# Patient Record
Sex: FEMALE | Race: WHITE | NOT HISPANIC OR LATINO | Employment: OTHER | URBAN - METROPOLITAN AREA
[De-identification: names, ages, dates, MRNs, and addresses within clinical notes are randomized per-mention and may not be internally consistent; named-entity substitution may affect disease eponyms.]

---

## 2022-12-07 ENCOUNTER — OFFICE VISIT (OUTPATIENT)
Dept: PULMONOLOGY | Facility: CLINIC | Age: 87
End: 2022-12-07
Payer: MEDICARE

## 2022-12-07 VITALS
OXYGEN SATURATION: 97 % | HEART RATE: 66 BPM | DIASTOLIC BLOOD PRESSURE: 64 MMHG | WEIGHT: 125 LBS | BODY MASS INDEX: 21.34 KG/M2 | HEIGHT: 64 IN | SYSTOLIC BLOOD PRESSURE: 116 MMHG

## 2022-12-07 DIAGNOSIS — R06.09 DYSPNEA ON EXERTION: ICD-10-CM

## 2022-12-07 DIAGNOSIS — C80.1 NEUROPATHY ASSOCIATED WITH CANCER: Primary | ICD-10-CM

## 2022-12-07 DIAGNOSIS — G63 NEUROPATHY ASSOCIATED WITH CANCER: Primary | ICD-10-CM

## 2022-12-07 DIAGNOSIS — R04.2 HEMOPTYSIS: ICD-10-CM

## 2022-12-07 PROCEDURE — 99214 PR OFFICE/OUTPT VISIT, EST, LEVL IV, 30-39 MIN: ICD-10-PCS | Mod: S$GLB,,, | Performed by: INTERNAL MEDICINE

## 2022-12-07 PROCEDURE — 99999 PR PBB SHADOW E&M-NEW PATIENT-LVL III: CPT | Mod: PBBFAC,,,

## 2022-12-07 PROCEDURE — 3288F FALL RISK ASSESSMENT DOCD: CPT | Mod: CPTII,S$GLB,, | Performed by: INTERNAL MEDICINE

## 2022-12-07 PROCEDURE — 1160F RVW MEDS BY RX/DR IN RCRD: CPT | Mod: CPTII,S$GLB,, | Performed by: INTERNAL MEDICINE

## 2022-12-07 PROCEDURE — 1159F MED LIST DOCD IN RCRD: CPT | Mod: CPTII,S$GLB,, | Performed by: INTERNAL MEDICINE

## 2022-12-07 PROCEDURE — 3288F PR FALLS RISK ASSESSMENT DOCUMENTED: ICD-10-PCS | Mod: CPTII,S$GLB,, | Performed by: INTERNAL MEDICINE

## 2022-12-07 PROCEDURE — 1160F PR REVIEW ALL MEDS BY PRESCRIBER/CLIN PHARMACIST DOCUMENTED: ICD-10-PCS | Mod: CPTII,S$GLB,, | Performed by: INTERNAL MEDICINE

## 2022-12-07 PROCEDURE — 99999 PR PBB SHADOW E&M-NEW PATIENT-LVL III: ICD-10-PCS | Mod: PBBFAC,,,

## 2022-12-07 PROCEDURE — 1101F PR PT FALLS ASSESS DOC 0-1 FALLS W/OUT INJ PAST YR: ICD-10-PCS | Mod: CPTII,S$GLB,, | Performed by: INTERNAL MEDICINE

## 2022-12-07 PROCEDURE — 1159F PR MEDICATION LIST DOCUMENTED IN MEDICAL RECORD: ICD-10-PCS | Mod: CPTII,S$GLB,, | Performed by: INTERNAL MEDICINE

## 2022-12-07 PROCEDURE — 1101F PT FALLS ASSESS-DOCD LE1/YR: CPT | Mod: CPTII,S$GLB,, | Performed by: INTERNAL MEDICINE

## 2022-12-07 PROCEDURE — 99214 OFFICE O/P EST MOD 30 MIN: CPT | Mod: S$GLB,,, | Performed by: INTERNAL MEDICINE

## 2022-12-07 RX ORDER — GABAPENTIN 400 MG/1
800 CAPSULE ORAL 3 TIMES DAILY
Qty: 120 CAPSULE | Refills: 6 | Status: SHIPPED | OUTPATIENT
Start: 2022-12-07 | End: 2023-02-01 | Stop reason: SDUPTHER

## 2022-12-07 RX ORDER — GABAPENTIN 400 MG/1
400 CAPSULE ORAL
COMMUNITY
End: 2022-12-07 | Stop reason: SDUPTHER

## 2022-12-07 RX ORDER — FLUTICASONE FUROATE AND VILANTEROL 100; 25 UG/1; UG/1
100 POWDER RESPIRATORY (INHALATION) DAILY
COMMUNITY
End: 2023-09-26 | Stop reason: SDUPTHER

## 2022-12-07 RX ORDER — POTASSIUM CHLORIDE 750 MG/1
10 TABLET, EXTENDED RELEASE ORAL
COMMUNITY
End: 2023-02-14 | Stop reason: SDUPTHER

## 2022-12-07 RX ORDER — LYSINE HCL 500 MG
1 TABLET ORAL DAILY
COMMUNITY

## 2022-12-07 RX ORDER — ALBUTEROL SULFATE 90 UG/1
2 AEROSOL, METERED RESPIRATORY (INHALATION) EVERY 4 HOURS PRN
Qty: 8 G | Refills: 6 | Status: SHIPPED | OUTPATIENT
Start: 2022-12-07 | End: 2023-11-01 | Stop reason: SDUPTHER

## 2022-12-07 RX ORDER — CALCIUM CARBONATE/VITAMIN D3 500-10/5ML
400 LIQUID (ML) ORAL
COMMUNITY
End: 2023-02-14 | Stop reason: SDUPTHER

## 2022-12-07 NOTE — PROGRESS NOTES
Subjective:       Patient ID: Ciara Verma is a 89 y.o. female.    Chief Complaint: f/u lung CA, COPD    HPI  89 yo female with history of arthritis, former smoker, who developed hemoptysis and nosebleed following a fall. This prompted a CT of the chest showing MITESH density increased in solid compnent compared to 2018 study. I saw her in October 2019 for that reason. PET/CT imaging showed low-level activity and plans were made for follow-up pet imaging for surveillance. She is here for that reason today. In the interim she has had issues with low-grade fever and a recently discovered pericardial effusion and atrial fibrillation as well as elevated serum cardiac inflammatory markers. She has evaluation ongoing by cardiology.. Hemoptysis lasted x 2 episodes, none since. She continues to have intermittent chest pain most likely related to reflux    June 2020:  - Referral made to Rad Onc for SBRT of likely lung CA    November 2020 ([facility])  - XRT completed  - PET stable  - Complains of intermittent severe cough and dyspnea  - Somewhat improved with nebulizer  - Having significant reflux and retained esophageal secretions  - Previously had dilation by Dr. Ch    November 2022 ([facility])  - Saw MYRON Barnett for hemoptysis in August  - Scant, episodic  - Eliquis held  - Admitted at [facility] in Sept and had CT chest  - I compared it to PET-CT done in 2021 and was stable  - Hemoptysis has since resolved  - Stable MAC and chronic cough    December 2022 ([place])  - Seen by me last month at [facility] with the above history  - Here to get established with me at new location ([facility])  - Symptoms remain similar to November  - New symptoms of burning/lancinating pain and discomfort at site of prior irradiation  - Emergence of these symptoms coincides with decreasing her Neurontin dose from 800mg TID (which she has been on for years) to 400mg TID  - Still with scant/spotty hemoptysis in the AM several times/week  - Had visit with Dr. Schumacher and had  imaging, [facility]AS per their report (records not available)  - No new complaints    Review of Systems  as per HPI otherwise negative    Past medical, surgical, social and family history reviewed and updated as per HPI    Objective:      Physical Exam   Constitutional: She is oriented to person, place, and time. She appears well-developed. No distress.   HENT:   Mouth/Throat: No oropharyngeal exudate.   Neck: No JVD present.   Cardiovascular: Normal rate and regular rhythm.   Murmur heard.  Pulmonary/Chest: Normal expansion, effort normal and breath sounds normal. She has no wheezes.   Abdominal: Soft. There is no abdominal tenderness.   Musculoskeletal:         General: No edema.      Cervical back: Neck supple.   Neurological: She is alert and oriented to person, place, and time.   Psychiatric: She has a normal mood and affect.   Nursing note and vitals reviewed.        Assessment:       1. Neuropathy associated with cancer    2. Dyspnea on exertion    3. Hemoptysis            Orders Placed This Encounter   Procedures    CT Chest Without Contrast     Standing Status:   Future     Standing Expiration Date:   12/7/2023     Order Specific Question:   May the Radiologist modify the order per protocol to meet the clinical needs of the patient?     Answer:   Yes    Spirometry with/without bronchodilator     Standing Status:   Future     Standing Expiration Date:   12/7/2023     Order Specific Question:   Release to patient     Answer:   Immediate           Plan:       - Overall stable  - Emergence of neuropathy in area of prior irradiation likely due to decreasing her neurontin dose, will resume 800mg TID  - Continue stiolto daily  - Add ventolin HFA PRN  - Will get non-contrast CT and PFTs in 3 months on RTC, sooner if needed  - Discussed above issues and plans with patient and her  who voiced understanding and agreement

## 2023-01-26 ENCOUNTER — TELEPHONE (OUTPATIENT)
Dept: PULMONOLOGY | Facility: CLINIC | Age: 88
End: 2023-01-26
Payer: MEDICARE

## 2023-01-26 NOTE — TELEPHONE ENCOUNTER
Spoke with Patient daughter. Patient need a hospital follow up. Sent message to advance access to schedule appt. Informed daughter as soon as appt is scheduled I will contact them. Patient daughter verbalized understanding.

## 2023-01-26 NOTE — TELEPHONE ENCOUNTER
----- Message from Oneal Singleton sent at 1/26/2023  9:02 AM CST -----  Regarding: PLS READ IMMED  Pt daughter is calling in to see if her mother can be seen today . She went tot he Er yesterday fr shortness of breath and nothing has changed . The Er stated she needed to get in to be seen asap . Pls call back at [phone]    Thank you    Mary

## 2023-01-31 ENCOUNTER — TELEPHONE (OUTPATIENT)
Dept: PULMONOLOGY | Facility: CLINIC | Age: 88
End: 2023-01-31
Payer: MEDICARE

## 2023-01-31 DIAGNOSIS — J44.1 COPD EXACERBATION: Primary | ICD-10-CM

## 2023-01-31 RX ORDER — PREDNISONE 20 MG/1
TABLET ORAL
Qty: 20 TABLET | Refills: 0 | Status: SHIPPED | OUTPATIENT
Start: 2023-01-31 | End: 2023-11-08

## 2023-01-31 RX ORDER — ALBUTEROL SULFATE 0.83 MG/ML
2.5 SOLUTION RESPIRATORY (INHALATION)
Qty: 360 ML | Refills: 11 | Status: SHIPPED | OUTPATIENT
Start: 2023-01-31 | End: 2023-02-01 | Stop reason: SDUPTHER

## 2023-01-31 NOTE — TELEPHONE ENCOUNTER
Spoke with patient's daughter who is asking for prednisone to help with breathing until appointment with Dr. Rosario on Thursday also is asking for refill on albuterol sulfate for neb treatments. Please advise

## 2023-01-31 NOTE — TELEPHONE ENCOUNTER
----- Message from Aurelia Stanley sent at 1/31/2023 11:10 AM CST -----  Contact: daughter  Pt daughter Dee is asking for an return call in reference to pt medication albuterol (VENTOLIN HFA) 90 mcg/actuation inhaler ,please call back at [phone] Thx CJ

## 2023-01-31 NOTE — TELEPHONE ENCOUNTER
Spoke with Patient daughter she states mom have and appt with Dr Harris for hospital f/u. They are requesting prednisone and albuterol for symptoms she is having. Informed pt daughter that I will forward message to Dr Han. Also that the albuterol has 6 refills. Pt daughter verbalized understanding.

## 2023-01-31 NOTE — TELEPHONE ENCOUNTER
----- Message from Jennifer Lara sent at 1/31/2023  9:22 AM CST -----  Contact: Dee Price called and requested if prednesone and albuterol could be prescribed to last until the pts appt on Thursday. The pt is wheezing and has difficulty breathing. Please call her back at [phone].       Thanks  TS

## 2023-02-01 ENCOUNTER — OFFICE VISIT (OUTPATIENT)
Dept: PULMONOLOGY | Facility: CLINIC | Age: 88
End: 2023-02-01
Payer: MEDICARE

## 2023-02-01 VITALS
BODY MASS INDEX: 21.46 KG/M2 | OXYGEN SATURATION: 94 % | HEIGHT: 64 IN | DIASTOLIC BLOOD PRESSURE: 78 MMHG | HEART RATE: 72 BPM | SYSTOLIC BLOOD PRESSURE: 98 MMHG

## 2023-02-01 DIAGNOSIS — C80.1 NEUROPATHY ASSOCIATED WITH CANCER: ICD-10-CM

## 2023-02-01 DIAGNOSIS — G63 NEUROPATHY ASSOCIATED WITH CANCER: ICD-10-CM

## 2023-02-01 DIAGNOSIS — J41.8 MIXED SIMPLE AND MUCOPURULENT CHRONIC BRONCHITIS: Primary | ICD-10-CM

## 2023-02-01 DIAGNOSIS — J44.1 COPD EXACERBATION: ICD-10-CM

## 2023-02-01 PROCEDURE — 1160F RVW MEDS BY RX/DR IN RCRD: CPT | Mod: CPTII,S$GLB,, | Performed by: INTERNAL MEDICINE

## 2023-02-01 PROCEDURE — 1126F PR PAIN SEVERITY QUANTIFIED, NO PAIN PRESENT: ICD-10-PCS | Mod: CPTII,S$GLB,, | Performed by: INTERNAL MEDICINE

## 2023-02-01 PROCEDURE — 1101F PR PT FALLS ASSESS DOC 0-1 FALLS W/OUT INJ PAST YR: ICD-10-PCS | Mod: CPTII,S$GLB,, | Performed by: INTERNAL MEDICINE

## 2023-02-01 PROCEDURE — 1126F AMNT PAIN NOTED NONE PRSNT: CPT | Mod: CPTII,S$GLB,, | Performed by: INTERNAL MEDICINE

## 2023-02-01 PROCEDURE — 1160F PR REVIEW ALL MEDS BY PRESCRIBER/CLIN PHARMACIST DOCUMENTED: ICD-10-PCS | Mod: CPTII,S$GLB,, | Performed by: INTERNAL MEDICINE

## 2023-02-01 PROCEDURE — 3288F FALL RISK ASSESSMENT DOCD: CPT | Mod: CPTII,S$GLB,, | Performed by: INTERNAL MEDICINE

## 2023-02-01 PROCEDURE — 3288F PR FALLS RISK ASSESSMENT DOCUMENTED: ICD-10-PCS | Mod: CPTII,S$GLB,, | Performed by: INTERNAL MEDICINE

## 2023-02-01 PROCEDURE — 1101F PT FALLS ASSESS-DOCD LE1/YR: CPT | Mod: CPTII,S$GLB,, | Performed by: INTERNAL MEDICINE

## 2023-02-01 PROCEDURE — 99215 OFFICE O/P EST HI 40 MIN: CPT | Mod: S$GLB,,, | Performed by: INTERNAL MEDICINE

## 2023-02-01 PROCEDURE — 1159F PR MEDICATION LIST DOCUMENTED IN MEDICAL RECORD: ICD-10-PCS | Mod: CPTII,S$GLB,, | Performed by: INTERNAL MEDICINE

## 2023-02-01 PROCEDURE — 99999 PR PBB SHADOW E&M-EST. PATIENT-LVL III: ICD-10-PCS | Mod: PBBFAC,,, | Performed by: INTERNAL MEDICINE

## 2023-02-01 PROCEDURE — 99215 PR OFFICE/OUTPT VISIT, EST, LEVL V, 40-54 MIN: ICD-10-PCS | Mod: S$GLB,,, | Performed by: INTERNAL MEDICINE

## 2023-02-01 PROCEDURE — 99999 PR PBB SHADOW E&M-EST. PATIENT-LVL III: CPT | Mod: PBBFAC,,, | Performed by: INTERNAL MEDICINE

## 2023-02-01 PROCEDURE — 1159F MED LIST DOCD IN RCRD: CPT | Mod: CPTII,S$GLB,, | Performed by: INTERNAL MEDICINE

## 2023-02-01 RX ORDER — GABAPENTIN 400 MG/1
800 CAPSULE ORAL 3 TIMES DAILY
Qty: 180 CAPSULE | Refills: 6 | Status: SHIPPED | OUTPATIENT
Start: 2023-02-01 | End: 2023-02-14 | Stop reason: SDUPTHER

## 2023-02-01 RX ORDER — PREDNISONE 20 MG/1
TABLET ORAL
Qty: 60 TABLET | Refills: 1 | Status: SHIPPED | OUTPATIENT
Start: 2023-02-01 | End: 2023-11-08

## 2023-02-01 RX ORDER — BUDESONIDE 0.5 MG/2ML
0.5 INHALANT ORAL 2 TIMES DAILY
Qty: 120 ML | Refills: 5 | Status: SHIPPED | OUTPATIENT
Start: 2023-02-01 | End: 2023-11-08 | Stop reason: SDUPTHER

## 2023-02-01 RX ORDER — ALBUTEROL SULFATE 0.83 MG/ML
2.5 SOLUTION RESPIRATORY (INHALATION)
Qty: 360 ML | Refills: 11 | Status: SHIPPED | OUTPATIENT
Start: 2023-02-01 | End: 2024-02-01

## 2023-02-01 RX ORDER — FLUTICASONE FUROATE, UMECLIDINIUM BROMIDE AND VILANTEROL TRIFENATATE 200; 62.5; 25 UG/1; UG/1; UG/1
1 POWDER RESPIRATORY (INHALATION) DAILY
Qty: 60 EACH | Refills: 6 | Status: SHIPPED | OUTPATIENT
Start: 2023-02-01 | End: 2023-09-18 | Stop reason: SDUPTHER

## 2023-02-01 NOTE — PROGRESS NOTES
Subjective:       Patient ID: Ciara Verma is a 89 y.o. female.    Chief Complaint: Shortness of Breath    Shortness of Breath      89 yo female with history of arthritis, former smoker, who developed hemoptysis and nosebleed following a fall. This prompted a CT of the chest showing MITESH density increased in solid compnent compared to 2018 study. I saw her in October 2019 for that reason. PET/CT imaging showed low-level activity and plans were made for follow-up pet imaging for surveillance. She is here for that reason today. In the interim she has had issues with low-grade fever and a recently discovered pericardial effusion and atrial fibrillation as well as elevated serum cardiac inflammatory markers. She has evaluation ongoing by cardiology.. Hemoptysis lasted x 2 episodes, none since. She continues to have intermittent chest pain most likely related to reflux    November 2020 ([facility])  - XRT completed  - PET stable  - Complains of intermittent severe cough and dyspnea  - Somewhat improved with nebulizer  - Having significant reflux and retained esophageal secretions  - Previously had dilation by Dr. Ch    November 2022 ([facility])  - Saw MYRON Barnett for hemoptysis in August  - Scant, episodic  - Eliquis held  - Admitted at [facility] in Sept and had CT chest  - I compared it to PET-CT done in 2021 and was stable  - Hemoptysis has since resolved  - Stable MAC and chronic cough     December 2022 ([facility])  - Seen by me last month at [facility] with the above history  - Here to get established with me at new location ([facility])  - Symptoms remain similar to November  - New symptoms of burning/lancinating pain and discomfort at site of prior irradiation  - Emergence of these symptoms coincides with decreasing her Neurontin dose from 800mg TID (which she has been on for years) to 400mg TID  - Still with scant/spotty hemoptysis in the AM several times/week  - Had visit with Dr. Schumacher and had imaging, [facility] per their report (records not  available)    Jan 2023  Since last visit she was admitted in January to [facility] with E coli urosepsis.  She was treated with antibiotics but continued to require supplemental oxygen at home.  Here over the last week or so she has had worsening dyspnea with wheezing and cough.  No fever and no productive sputum.  On January 24th she was seen in her primary care office and her SpO2 was noted to be 84%.  She was sent to the emergency room for that reason.  There she was placed on supplemental oxygen at 2 L. She had a CTA of the chest which did not show any pneumonia, no pulmonary emboli, no pleural effusions.  This showed stable consolidation in her previously treated upper lobe.  BNP was not elevated.  She was discharged home with instructions to continue to use her nebulizer as needed for shortness of breath and wear her oxygen.  She is here today for follow-up.  She is with her  and son.  She describes ongoing fatigue as well as dyspnea with exertion and some at rest.  No fevers or chills.  Stable neuropathic type chest pain in the area of her prior radiation.  She was previously using Breo but is currently out of this.  She is using her nebulizers 3 to 4 times a day.  No hemoptysis.    Review of Systems   Respiratory:  Positive for shortness of breath.    as per history of present illness otherwise negative    Objective:      Physical Exam   Constitutional: She is oriented to person, place, and time.   Chronically ill-appearing but not acutely so   HENT:   Head: Normocephalic.   Cardiovascular: Normal rate and regular rhythm.   No murmur heard.  Pulmonary/Chest: Normal expansion and effort normal.   Globally decreased breath sounds with some slight expiratory wheezing which is low-pitched   Abdominal: Soft. She exhibits no distension. There is no hepatosplenomegaly.   Musculoskeletal:         General: No edema.      Cervical back: Neck supple.   Neurological: She is alert and oriented to person,  place, and time.   Psychiatric: She has a normal mood and affect.   Nursing note and vitals reviewed.        Assessment:       1. Mixed simple and mucopurulent chronic bronchitis    2. Neuropathy associated with cancer          I personally reviewed [facility] including clinic notes, ER visit notes, history and physical and discharge summaries.  This directly assisted with my clinical decision-making.  I also reviewed the CT scan report from her CTA done on January 25th.      Plan:       Acute exacerbation of chronic bronchitis/COPD.  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  My current strategy will be to increase her inhaled therapy to Trelegy 200 once daily, we will also add Pulmicort 0.5 mg nebulized b.i.d..  Also we will treat with a 2 week prednisone taper from 40 mg down to 10.  I refilled her Neurontin and her albuterol for nebulizer today as well.  I a.m. somewhat concerned that this may be an ongoing and worsening problem should it be demonstrated that she has significant shunting through her previously treated lung cancer and postradiation consolidation.  We will see how she responds to current treatment strategy.  She is to continue to follow up with her urologist regarding her urosepsis and persistent urinary bacteria.  I discussed all the above in detail with the patient as well as her family who voiced understanding and agreement.  I will see her back for short-term follow-up on February 14th, sooner if needed

## 2023-02-01 NOTE — TELEPHONE ENCOUNTER
Prescription for albuterol solution and prednisone sent   [facility] [address] [facility]

## 2023-02-02 ENCOUNTER — TELEPHONE (OUTPATIENT)
Dept: PULMONOLOGY | Facility: CLINIC | Age: 88
End: 2023-02-02
Payer: MEDICARE

## 2023-02-02 NOTE — TELEPHONE ENCOUNTER
----- Message from Benjamín Trimble sent at 2/2/2023 11:16 AM CST -----  Contact: kerrie Price is calling to see what medication can be giving for patient for anemia. Please call her back at [phone].          Thanks  DD

## 2023-02-03 ENCOUNTER — TELEPHONE (OUTPATIENT)
Dept: PULMONOLOGY | Facility: CLINIC | Age: 88
End: 2023-02-03
Payer: MEDICARE

## 2023-02-03 NOTE — TELEPHONE ENCOUNTER
Spoke with patient daughter and notified her she will have to discuss the anemia with Dr. Hernandez. She may need iron tablets versus something else per Dr Han advice. Patient daughter verbalized understanding.

## 2023-02-03 NOTE — TELEPHONE ENCOUNTER
----- Message from Denis Han MD sent at 2/3/2023  8:32 AM CST -----  Contact: kerrie  She will have to discuss the anemia with Dr. Hernandez. She may need iron tablets versus something else  Denis    ----- Message -----  From: Andres Bryd LPN  Sent: 2/2/2023   1:15 PM CST  To: Denis Han MD    Julian[facility] Patient was seen yesterday. Her daughter said they forgot to tell you pt is Anemic. They want to know would that cause her symptoms and could you prescribe her something for it. Please advise.  ----- Message -----  From: Benjamín Trimble  Sent: 2/2/2023  11:18 AM CST  To: Guille Price is calling to see what medication can be giving for patient for anemia. Please call her back at [phone].          Thanks  DD

## 2023-02-14 ENCOUNTER — OFFICE VISIT (OUTPATIENT)
Dept: PULMONOLOGY | Facility: CLINIC | Age: 88
End: 2023-02-14
Payer: MEDICARE

## 2023-02-14 VITALS
HEART RATE: 84 BPM | BODY MASS INDEX: 21.64 KG/M2 | OXYGEN SATURATION: 96 % | WEIGHT: 126.75 LBS | HEIGHT: 64 IN | RESPIRATION RATE: 20 BRPM | SYSTOLIC BLOOD PRESSURE: 117 MMHG | DIASTOLIC BLOOD PRESSURE: 73 MMHG

## 2023-02-14 DIAGNOSIS — Z85.118 PERSONAL HISTORY OF LUNG CANCER: Chronic | ICD-10-CM

## 2023-02-14 DIAGNOSIS — J41.8 MIXED SIMPLE AND MUCOPURULENT CHRONIC BRONCHITIS: Chronic | ICD-10-CM

## 2023-02-14 DIAGNOSIS — J70.0 POST-RADIATION PNEUMONITIS: Chronic | ICD-10-CM

## 2023-02-14 PROCEDURE — 99214 PR OFFICE/OUTPT VISIT, EST, LEVL IV, 30-39 MIN: ICD-10-PCS | Mod: S$GLB,,, | Performed by: INTERNAL MEDICINE

## 2023-02-14 PROCEDURE — 99999 PR PBB SHADOW E&M-EST. PATIENT-LVL III: CPT | Mod: PBBFAC,,, | Performed by: INTERNAL MEDICINE

## 2023-02-14 PROCEDURE — 1160F RVW MEDS BY RX/DR IN RCRD: CPT | Mod: CPTII,S$GLB,, | Performed by: INTERNAL MEDICINE

## 2023-02-14 PROCEDURE — 3288F FALL RISK ASSESSMENT DOCD: CPT | Mod: CPTII,S$GLB,, | Performed by: INTERNAL MEDICINE

## 2023-02-14 PROCEDURE — 1159F MED LIST DOCD IN RCRD: CPT | Mod: CPTII,S$GLB,, | Performed by: INTERNAL MEDICINE

## 2023-02-14 PROCEDURE — 1101F PR PT FALLS ASSESS DOC 0-1 FALLS W/OUT INJ PAST YR: ICD-10-PCS | Mod: CPTII,S$GLB,, | Performed by: INTERNAL MEDICINE

## 2023-02-14 PROCEDURE — 99999 PR PBB SHADOW E&M-EST. PATIENT-LVL III: ICD-10-PCS | Mod: PBBFAC,,, | Performed by: INTERNAL MEDICINE

## 2023-02-14 PROCEDURE — 99214 OFFICE O/P EST MOD 30 MIN: CPT | Mod: S$GLB,,, | Performed by: INTERNAL MEDICINE

## 2023-02-14 PROCEDURE — 1101F PT FALLS ASSESS-DOCD LE1/YR: CPT | Mod: CPTII,S$GLB,, | Performed by: INTERNAL MEDICINE

## 2023-02-14 PROCEDURE — 3288F PR FALLS RISK ASSESSMENT DOCUMENTED: ICD-10-PCS | Mod: CPTII,S$GLB,, | Performed by: INTERNAL MEDICINE

## 2023-02-14 PROCEDURE — 1160F PR REVIEW ALL MEDS BY PRESCRIBER/CLIN PHARMACIST DOCUMENTED: ICD-10-PCS | Mod: CPTII,S$GLB,, | Performed by: INTERNAL MEDICINE

## 2023-02-14 PROCEDURE — 1159F PR MEDICATION LIST DOCUMENTED IN MEDICAL RECORD: ICD-10-PCS | Mod: CPTII,S$GLB,, | Performed by: INTERNAL MEDICINE

## 2023-02-14 RX ORDER — TRIMETHOPRIM 100 MG/1
100 TABLET ORAL NIGHTLY
COMMUNITY
Start: 2023-01-16 | End: 2023-02-14 | Stop reason: SDUPTHER

## 2023-02-14 RX ORDER — SOLIFENACIN SUCCINATE 5 MG/1
5 TABLET, FILM COATED ORAL
COMMUNITY

## 2023-02-14 RX ORDER — FUROSEMIDE 40 MG/1
40 TABLET ORAL
COMMUNITY
Start: 2022-11-26

## 2023-02-14 RX ORDER — HYDROCODONE BITARTRATE AND ACETAMINOPHEN 7.5; 325 MG/1; MG/1
.5-1 TABLET ORAL
COMMUNITY
Start: 2023-02-07

## 2023-02-14 RX ORDER — DICLOFENAC SODIUM 10 MG/G
2 GEL TOPICAL 2 TIMES DAILY
COMMUNITY
Start: 2022-11-11

## 2023-02-14 RX ORDER — ATORVASTATIN CALCIUM 40 MG/1
TABLET, FILM COATED ORAL
COMMUNITY
Start: 2022-08-26

## 2023-02-14 RX ORDER — DIAPER,BRIEF,INFANT-TODD,DISP
EACH MISCELLANEOUS
COMMUNITY
End: 2023-02-14 | Stop reason: SDUPTHER

## 2023-02-14 RX ORDER — NITROFURANTOIN 25; 75 MG/1; MG/1
100 CAPSULE ORAL NIGHTLY
COMMUNITY
Start: 2023-01-16

## 2023-02-14 RX ORDER — TAMSULOSIN HYDROCHLORIDE 0.4 MG/1
1 CAPSULE ORAL NIGHTLY
COMMUNITY
Start: 2023-01-02

## 2023-02-14 RX ORDER — PANTOPRAZOLE SODIUM 40 MG/1
40 TABLET, DELAYED RELEASE ORAL 2 TIMES DAILY
COMMUNITY
Start: 2022-12-04

## 2023-02-14 RX ORDER — GABAPENTIN 600 MG/1
600 TABLET ORAL 2 TIMES DAILY
COMMUNITY
Start: 2022-11-22

## 2023-02-14 RX ORDER — AMOXICILLIN AND CLAVULANATE POTASSIUM 875; 125 MG/1; MG/1
TABLET, FILM COATED ORAL
COMMUNITY
Start: 2023-02-11

## 2023-02-14 RX ORDER — PHENAZOPYRIDINE HYDROCHLORIDE 200 MG/1
200 TABLET, FILM COATED ORAL 2 TIMES DAILY
COMMUNITY
Start: 2023-01-16

## 2023-02-14 RX ORDER — MAGNESIUM 250 MG
1 TABLET ORAL DAILY
COMMUNITY

## 2023-02-14 RX ORDER — DULOXETIN HYDROCHLORIDE 30 MG/1
30 CAPSULE, DELAYED RELEASE ORAL
COMMUNITY
Start: 2022-12-19

## 2023-02-14 RX ORDER — METOPROLOL SUCCINATE 25 MG/1
25 TABLET, EXTENDED RELEASE ORAL 2 TIMES DAILY
COMMUNITY
Start: 2023-01-25

## 2023-02-14 RX ORDER — POTASSIUM CHLORIDE 750 MG/1
1 TABLET, EXTENDED RELEASE ORAL DAILY
COMMUNITY
Start: 2022-06-26

## 2023-02-14 RX ORDER — ASPIRIN 81 MG/1
1 TABLET ORAL EVERY MORNING
COMMUNITY

## 2023-02-14 RX ORDER — AMOXICILLIN 500 MG
1 CAPSULE ORAL EVERY MORNING
COMMUNITY

## 2023-02-14 NOTE — PROGRESS NOTES
Subjective:       Patient ID: Ciara Verma is a 89 y.o. female.    Chief Complaint: No chief complaint on file.    HPI    89 yo female with history of arthritis, former smoker, who developed hemoptysis and nosebleed following a fall. This prompted a CT of the chest showing MITESH density increased in solid compnent compared to 2018 study. I saw her in October 2019 for that reason. PET/CT imaging showed low-level activity and plans were made for follow-up pet imaging for surveillance. She is here for that reason today. In the interim she has had issues with low-grade fever and a recently discovered pericardial effusion and atrial fibrillation as well as elevated serum cardiac inflammatory markers. She has evaluation ongoing by cardiology.. Hemoptysis lasted x 2 episodes, none since. She continues to have intermittent chest pain most likely related to reflux    November 2020 ([facility])  - XRT completed  - PET stable  - Complains of intermittent severe cough and dyspnea  - Somewhat improved with nebulizer  - Having significant reflux and retained esophageal secretions  - Previously had dilation by Dr. Ch    November 2022 ([facility])  - Saw MYRON Barnett for hemoptysis in August  - Scant, episodic  - Eliquis held  - Admitted at [facility] in Sept and had CT chest  - I compared it to PET-CT done in 2021 and was stable  - Hemoptysis has since resolved  - Stable MAC and chronic cough     December 2022 ([facility])  - Seen by me last month at [facility] with the above history  - Here to get established with me at new location ([facility])  - Symptoms remain similar to November  - New symptoms of burning/lancinating pain and discomfort at site of prior irradiation  - Emergence of these symptoms coincides with decreasing her Neurontin dose from 800mg TID (which she has been on for years) to 400mg TID  - Still with scant/spotty hemoptysis in the AM several times/week  - Had visit with Dr. Schumacher and had imaging, [facility] per their report (records not available)      Jan 2023  Since last visit she was admitted in January to [facility] with E coli urosepsis.  She was treated with antibiotics but continued to require supplemental oxygen at home.  Here over the last week or so she has had worsening dyspnea with wheezing and cough.  No fever and no productive sputum.  On January 24th she was seen in her primary care office and her SpO2 was noted to be 84%.  She was sent to the emergency room for that reason.  There she was placed on supplemental oxygen at 2 L. She had a CTA of the chest which did not show any pneumonia, no pulmonary emboli, no pleural effusions.  This showed stable consolidation in her previously treated upper lobe.  BNP was not elevated.  She was discharged home with instructions to continue to use her nebulizer as needed for shortness of breath and wear her oxygen.  She is here today for follow-up.  She is with her  and son.  She describes ongoing fatigue as well as dyspnea with exertion and some at rest.  No fevers or chills.  Stable neuropathic type chest pain in the area of her prior radiation.  She was previously using Breo but is currently out of this.  She is using her nebulizers 3 to 4 times a day.  No hemoptysis.    Seen last visit with the above history.  At that visit we increased her Trelegy to the 200 mcg dose as well as added Pulmicort 0.5 mg b.i.d. also treated with a rapid prednisone taper.  Patient states she was started on Augmentin last week for a sinus infection by her primary care provider.  She was found to be anemic and there plans under way to treat this as well.  She is here today for follow up of the above.  She states that she still has intermittent bouts of severe dyspnea with some cough and a choking sensation as well as some wheezing.  She has an appointment to be re-evaluated for esophageal dilation.  Otherwise she is feeling some better than last visit.  She is here with her , caretaker and daughter is  conference done via phone.      Review of Systems  as per history of present illness otherwise negative    Objective:      Physical Exam   Constitutional: She is oriented to person, place, and time. She appears well-developed. No distress.   HENT:   Head: Normocephalic.   Neck: No JVD present.   Cardiovascular: Normal rate and regular rhythm.   No murmur heard.  Pulmonary/Chest: Normal expansion. She has no wheezes. She exhibits no tenderness.   Abdominal: Soft.   Musculoskeletal:         General: No edema.      Cervical back: Neck supple.   Neurological: She is alert and oriented to person, place, and time.   Psychiatric: She has a normal mood and affect.   Nursing note and vitals reviewed.        Assessment:       1. Post-radiation pneumonitis    2. Mixed simple and mucopurulent chronic bronchitis    3. Personal history of lung cancer          Plan:       Somewhat improved compared to last visit.  We will continue current regimen.  She is completed steroid course, none indicated at this time.  I discussed the risks and benefits of long-term steroid use with patient and her daughter and patient is not in favor of being on prednisone long-term.  She does seem to have a bit of some nasopharyngeal and sinus possibly allergic versus nonallergic congestion and rhinitis triggering some of these symptoms.  I recommended addition of a nonsedating antihistamine twice a day to help treat this.  She is to continue to follow up with her primary care physician incomplete her current course of antibiotics.  Continue to use inhaled medications as currently prescribed.  I will see her back in 4 weeks for fairly short-term follow-up visit, sooner if needed.  I discussed the above in detail with the patient and her family who voiced understanding and agreement.

## 2023-02-27 ENCOUNTER — TELEPHONE (OUTPATIENT)
Dept: PULMONOLOGY | Facility: CLINIC | Age: 88
End: 2023-02-27
Payer: MEDICARE

## 2023-02-27 NOTE — TELEPHONE ENCOUNTER
----- Message from Komal Guo sent at 2/27/2023  8:34 AM CST -----  GI, Dr Lundy is requesting a pulmonary clearance for the pt. She is having a scope done on 3/16. Call back number is [phone] ext [phone]. Aubrey shearer

## 2023-02-27 NOTE — TELEPHONE ENCOUNTER
Spoke with Nurse at  Dr Sesay office. I have received clearance form and  will give to Dr Han in the morning. Will fax when completed. Understanding verbalized.

## 2023-03-22 ENCOUNTER — CLINICAL SUPPORT (OUTPATIENT)
Dept: PULMONOLOGY | Facility: CLINIC | Age: 88
End: 2023-03-22
Payer: MEDICARE

## 2023-03-22 ENCOUNTER — HOSPITAL ENCOUNTER (OUTPATIENT)
Dept: RADIOLOGY | Facility: HOSPITAL | Age: 88
Discharge: HOME | End: 2023-03-22
Attending: INTERNAL MEDICINE
Payer: MEDICARE

## 2023-03-22 ENCOUNTER — OFFICE VISIT (OUTPATIENT)
Dept: PULMONOLOGY | Facility: CLINIC | Age: 88
End: 2023-03-22
Payer: MEDICARE

## 2023-03-22 VITALS
HEIGHT: 65 IN | BODY MASS INDEX: 21.81 KG/M2 | SYSTOLIC BLOOD PRESSURE: 118 MMHG | HEART RATE: 65 BPM | DIASTOLIC BLOOD PRESSURE: 78 MMHG | WEIGHT: 130.94 LBS | OXYGEN SATURATION: 96 % | RESPIRATION RATE: 20 BRPM

## 2023-03-22 DIAGNOSIS — J70.0 POST-RADIATION PNEUMONITIS: Chronic | ICD-10-CM

## 2023-03-22 DIAGNOSIS — R06.09 DYSPNEA ON EXERTION: ICD-10-CM

## 2023-03-22 DIAGNOSIS — J41.8 MIXED SIMPLE AND MUCOPURULENT CHRONIC BRONCHITIS: Chronic | ICD-10-CM

## 2023-03-22 DIAGNOSIS — R04.2 HEMOPTYSIS: ICD-10-CM

## 2023-03-22 DIAGNOSIS — M79.2 NEURALGIA OF CHEST: Primary | ICD-10-CM

## 2023-03-22 DIAGNOSIS — Z85.118 PERSONAL HISTORY OF LUNG CANCER: Chronic | ICD-10-CM

## 2023-03-22 LAB
BRPFT: ABNORMAL
FEF 25 75 CHG: 7 %
FEF 25 75 LLN: 1.14
FEF 25 75 POST REF: 16.9 %
FEF 25 75 PRE REF: 15.8 %
FEF 25 75 REF: 2.26
FET100 CHG: -4.2 %
FEV1 CHG: 0.3 %
FEV1 FVC CHG: 2.1 %
FEV1 FVC LLN: 60
FEV1 FVC POST REF: 79.5 %
FEV1 FVC PRE REF: 77.9 %
FEV1 FVC REF: 76
FEV1 LLN: 1.14
FEV1 POST REF: 58.3 %
FEV1 PRE REF: 58.1 %
FEV1 REF: 1.7
FVC CHG: -1.8 %
FVC LLN: 1.53
FVC POST REF: 71.9 %
FVC PRE REF: 73.2 %
FVC REF: 2.29
PEF CHG: 14.9 %
PEF LLN: 2.04
PEF POST REF: 85.3 %
PEF PRE REF: 74.3 %
PEF REF: 3.76
POST FEF 25 75: 0.38 L/S (ref 1.14–3.39)
POST FET 100: 10.64 SEC
POST FEV1 FVC: 60.43 % (ref 59.91–92.01)
POST FEV1: 0.99 L (ref 1.14–2.27)
POST FVC: 1.65 L (ref 1.53–3.04)
POST PEF: 3.21 L/S (ref 2.04–5.48)
PRE FEF 25 75: 0.36 L/S (ref 1.14–3.39)
PRE FET 100: 11.11 SEC
PRE FEV1 FVC: 59.16 % (ref 59.91–92.01)
PRE FEV1: 0.99 L (ref 1.14–2.27)
PRE FVC: 1.68 L (ref 1.53–3.04)
PRE PEF: 2.79 L/S (ref 2.04–5.48)

## 2023-03-22 PROCEDURE — 99999 PR PBB SHADOW E&M-EST. PATIENT-LVL II: ICD-10-PCS | Mod: PBBFAC,,, | Performed by: INTERNAL MEDICINE

## 2023-03-22 PROCEDURE — 94060 PR EVAL OF BRONCHOSPASM: ICD-10-PCS | Mod: S$GLB,,, | Performed by: INTERNAL MEDICINE

## 2023-03-22 PROCEDURE — 1160F RVW MEDS BY RX/DR IN RCRD: CPT | Mod: CPTII,S$GLB,, | Performed by: INTERNAL MEDICINE

## 2023-03-22 PROCEDURE — 99214 PR OFFICE/OUTPT VISIT, EST, LEVL IV, 30-39 MIN: ICD-10-PCS | Mod: 25,S$GLB,, | Performed by: INTERNAL MEDICINE

## 2023-03-22 PROCEDURE — 99214 OFFICE O/P EST MOD 30 MIN: CPT | Mod: 25,S$GLB,, | Performed by: INTERNAL MEDICINE

## 2023-03-22 PROCEDURE — 71250 CT CHEST WITHOUT CONTRAST: ICD-10-PCS | Mod: 26,,, | Performed by: RADIOLOGY

## 2023-03-22 PROCEDURE — 1101F PR PT FALLS ASSESS DOC 0-1 FALLS W/OUT INJ PAST YR: ICD-10-PCS | Mod: CPTII,S$GLB,, | Performed by: INTERNAL MEDICINE

## 2023-03-22 PROCEDURE — 3288F PR FALLS RISK ASSESSMENT DOCUMENTED: ICD-10-PCS | Mod: CPTII,S$GLB,, | Performed by: INTERNAL MEDICINE

## 2023-03-22 PROCEDURE — 1159F MED LIST DOCD IN RCRD: CPT | Mod: CPTII,S$GLB,, | Performed by: INTERNAL MEDICINE

## 2023-03-22 PROCEDURE — 94060 EVALUATION OF WHEEZING: CPT | Mod: S$GLB,,, | Performed by: INTERNAL MEDICINE

## 2023-03-22 PROCEDURE — 71250 CT THORAX DX C-: CPT | Mod: TC

## 2023-03-22 PROCEDURE — 1101F PT FALLS ASSESS-DOCD LE1/YR: CPT | Mod: CPTII,S$GLB,, | Performed by: INTERNAL MEDICINE

## 2023-03-22 PROCEDURE — 3288F FALL RISK ASSESSMENT DOCD: CPT | Mod: CPTII,S$GLB,, | Performed by: INTERNAL MEDICINE

## 2023-03-22 PROCEDURE — 1159F PR MEDICATION LIST DOCUMENTED IN MEDICAL RECORD: ICD-10-PCS | Mod: CPTII,S$GLB,, | Performed by: INTERNAL MEDICINE

## 2023-03-22 PROCEDURE — 99999 PR PBB SHADOW E&M-EST. PATIENT-LVL II: CPT | Mod: PBBFAC,,, | Performed by: INTERNAL MEDICINE

## 2023-03-22 PROCEDURE — 1160F PR REVIEW ALL MEDS BY PRESCRIBER/CLIN PHARMACIST DOCUMENTED: ICD-10-PCS | Mod: CPTII,S$GLB,, | Performed by: INTERNAL MEDICINE

## 2023-03-22 PROCEDURE — 71250 CT THORAX DX C-: CPT | Mod: 26,,, | Performed by: RADIOLOGY

## 2023-03-22 RX ORDER — LIDOCAINE 50 MG/G
2 PATCH TOPICAL DAILY
Qty: 60 PATCH | Refills: 4 | Status: SHIPPED | OUTPATIENT
Start: 2023-03-22

## 2023-03-22 RX ORDER — GABAPENTIN 400 MG/1
800 CAPSULE ORAL 3 TIMES DAILY
COMMUNITY
Start: 2023-02-25 | End: 2024-02-20 | Stop reason: SDUPTHER

## 2023-03-22 NOTE — PROGRESS NOTES
"Subjective:       Patient ID: Ciara Verma is a 89 y.o. female.    Chief Complaint: No chief complaint on file.    HPI    89 yo female with history of arthritis, former smoker, who developed hemoptysis and nosebleed following a fall. This prompted a CT of the chest showing MITESH density increased in solid compnent compared to 2018 study. I saw her in October 2019 for that reason. PET/CT imaging showed low-level activity and plans were made for follow-up pet imaging for surveillance. This showed significant PET activity and subsequently underwent SBRT of suspect MITESH lesion. Has had issues with radiation pneumonia and occasional hemoptysis as well as post-treatment neuralgia and chest wall pain.    Last visit in Feb 2023 was having GI/esophageal issues. Since that time had esophageal dilation and is improved. Currently complaint is mainly left chest wall pain/neuralgia. On neurontin BID and PRN norco. Persistent/lancinating pain. Tender to touch. PFTs and CT chest for review today. Breathing is "pretty good". No hemoptysis. No fever. Here with  and son.    Review of Systems  as per HPI otherwise negative    Objective:      Physical Exam   Constitutional: She is oriented to person, place, and time. She appears well-developed. No distress.   HENT:   Head: Normocephalic.   Neck: No JVD present.   Cardiovascular: Normal rate and regular rhythm.   Pulmonary/Chest: Normal expansion, symmetric chest wall expansion, effort normal and breath sounds normal.   Abdominal: Soft. There is no hepatosplenomegaly.   Musculoskeletal:         General: No edema.      Cervical back: Neck supple.   Neurological: She is alert and oriented to person, place, and time.   Psychiatric: She has a normal mood and affect.         Assessment:       1. Neuralgia of chest    2. Mixed simple and mucopurulent chronic bronchitis    3. Post-radiation pneumonitis    4. Personal history of lung cancer          I personally reviewed spirometry from " today.  My interpretation is:  Moderate to moderately severe obstruction with no improvement following bronchodilator    I personally reviewed CT chest images.  This shows chronic stable findings of left upper lobe and right lower lobe bandlike atelectasis and post treatment radiation scar in the left upper lobe.  Additional findings as outlined by the radiologist below    Impression:     1. Band like consolidative density in the left upper lobe with differential considerations as discussed above.  Recommend clinical correlation and continued imaging follow-up.  2. Few scattered small noncalcified bilateral pulmonary nodules measuring up to 5 mm.  Attention on follow-up is recommended.  3. Trace left-sided pleural effusion  4. Mild age-indeterminate superior endplate compression deformity involving the T10 vertebral body.  Recommend comparison with prior imaging if available.      Plan:       Overall imaging looks stable compared to previous.  No areas of concerning pneumonitis or inflammation noted.  Nodules and radiation scar looks stable to me.  Spirometry shows about a 60% FEV1 which is better than I expected.  We will continue current inhaled therapy.  No indication at this time for antibiotics or steroids.  Her chest wall pain is suboptimally treated.  We will add some Lidoderm patches to the area daily and see if this gives her additional relief.  Ultimately she may need some help from pain management if this fails to improve things.  I discussed the above including test results with the patient and her family and answered all of their questions.  They voiced understanding and agreement with the treatment plan above.  I will see her back in 3 months, sooner if needed

## 2023-03-27 ENCOUNTER — TELEPHONE (OUTPATIENT)
Dept: PULMONOLOGY | Facility: CLINIC | Age: 88
End: 2023-03-27
Payer: MEDICARE

## 2023-03-27 DIAGNOSIS — J20.9 ACUTE BRONCHITIS, UNSPECIFIED ORGANISM: Primary | ICD-10-CM

## 2023-03-27 RX ORDER — METHYLPREDNISOLONE 4 MG/1
TABLET ORAL
Qty: 21 EACH | Refills: 1 | Status: SHIPPED | OUTPATIENT
Start: 2023-03-27 | End: 2023-04-17

## 2023-03-27 NOTE — TELEPHONE ENCOUNTER
Spoke to patient daughter. Patient still having pain under arm. Patches not working wanted to know if Dr Han can order a steroid for the next 3 weeks. Informed pt daughter I will send a message to Dr Han. Pt daughter verbalized understanding.

## 2023-03-27 NOTE — TELEPHONE ENCOUNTER
----- Message from Terese Roger sent at 3/27/2023 12:57 PM CDT -----  Regarding: RX Medicaion Question  Contact: Ciara Urbina's daughter, Dee, is requesting a  callback from the nurse in regards to knowing if Dr Han will prescribe more steroids for the patient because it helped with the pain across her breast and underarm that they discussed with him. The patches has not been helping.       [facility] [id number] [facility] [address]  Phone: [phone] Fax: [phone]     Dee can be reached at [phone]  Thanks

## 2023-04-11 DIAGNOSIS — M25.512 LEFT SHOULDER PAIN: Primary | ICD-10-CM

## 2023-04-11 DIAGNOSIS — Z79.899 OTHER LONG TERM (CURRENT) DRUG THERAPY: Primary | ICD-10-CM

## 2023-04-19 ENCOUNTER — TELEPHONE (OUTPATIENT)
Dept: PULMONOLOGY | Facility: CLINIC | Age: 88
End: 2023-04-19
Payer: MEDICARE

## 2023-04-19 NOTE — TELEPHONE ENCOUNTER
Returned call advised that order was placed for CT by Dr. Awad [facility] advised pt daughter to reach out to them  to discuss concerns, she verbalized understanding.

## 2023-04-19 NOTE — TELEPHONE ENCOUNTER
----- Message from Evangelina Henson sent at 4/19/2023  9:41 AM CDT -----  .Type:  Patient Call Back    Who Called: PT DAUGHTER       Does the patient know what this is regarding?: SHE WANTS TO SPEAK WITH THE OFFICE ABOUT THE CT    Would the patient rather a call back YES     Best Call Back Number: [phone]    Additional Information: Thank You

## 2023-09-18 DIAGNOSIS — J41.8 MIXED SIMPLE AND MUCOPURULENT CHRONIC BRONCHITIS: ICD-10-CM

## 2023-09-18 RX ORDER — FLUTICASONE FUROATE, UMECLIDINIUM BROMIDE AND VILANTEROL TRIFENATATE 200; 62.5; 25 UG/1; UG/1; UG/1
1 POWDER RESPIRATORY (INHALATION) DAILY
Qty: 60 EACH | Refills: 6 | Status: SHIPPED | OUTPATIENT
Start: 2023-09-18 | End: 2023-11-08

## 2023-09-26 DIAGNOSIS — J41.8 MIXED SIMPLE AND MUCOPURULENT CHRONIC BRONCHITIS: Primary | ICD-10-CM

## 2023-09-26 RX ORDER — FLUTICASONE FUROATE AND VILANTEROL TRIFENATATE 100; 25 UG/1; UG/1
1 POWDER RESPIRATORY (INHALATION) DAILY
Qty: 60 EACH | Refills: 6 | Status: SHIPPED | OUTPATIENT
Start: 2023-09-26 | End: 2023-11-08 | Stop reason: SDUPTHER

## 2023-11-01 DIAGNOSIS — R06.09 DYSPNEA ON EXERTION: ICD-10-CM

## 2023-11-01 RX ORDER — ALBUTEROL SULFATE 90 UG/1
2 AEROSOL, METERED RESPIRATORY (INHALATION) EVERY 4 HOURS PRN
Qty: 8 G | Refills: 6 | Status: SHIPPED | OUTPATIENT
Start: 2023-11-01

## 2023-11-01 NOTE — TELEPHONE ENCOUNTER
----- Message from Lalito Rosen MA sent at 11/1/2023  8:31 AM CDT -----  Contact: Daughter @ [phone]  The patient's daughter is calling to see if an emergency inhaler can be called in to the pharmacy below. Please give her a call back at [phone].        [facility] [address] [place] [address] [facility]  [address] [facility] [address]  Phone: [phone] Fax: [phone]

## 2023-11-08 ENCOUNTER — OFFICE VISIT (OUTPATIENT)
Dept: PULMONOLOGY | Facility: CLINIC | Age: 88
End: 2023-11-08
Payer: MEDICARE

## 2023-11-08 VITALS
OXYGEN SATURATION: 96 % | HEART RATE: 73 BPM | WEIGHT: 121.69 LBS | BODY MASS INDEX: 20.78 KG/M2 | RESPIRATION RATE: 18 BRPM | SYSTOLIC BLOOD PRESSURE: 120 MMHG | DIASTOLIC BLOOD PRESSURE: 62 MMHG | HEIGHT: 64 IN

## 2023-11-08 DIAGNOSIS — J41.8 MIXED SIMPLE AND MUCOPURULENT CHRONIC BRONCHITIS: ICD-10-CM

## 2023-11-08 DIAGNOSIS — J70.0 POST-RADIATION PNEUMONITIS: Chronic | ICD-10-CM

## 2023-11-08 DIAGNOSIS — Z85.118 PERSONAL HISTORY OF LUNG CANCER: Primary | Chronic | ICD-10-CM

## 2023-11-08 PROCEDURE — 1159F PR MEDICATION LIST DOCUMENTED IN MEDICAL RECORD: ICD-10-PCS | Mod: CPTII,S$GLB,, | Performed by: INTERNAL MEDICINE

## 2023-11-08 PROCEDURE — 99999 PR PBB SHADOW E&M-EST. PATIENT-LVL III: ICD-10-PCS | Mod: PBBFAC,,, | Performed by: INTERNAL MEDICINE

## 2023-11-08 PROCEDURE — 1125F AMNT PAIN NOTED PAIN PRSNT: CPT | Mod: CPTII,S$GLB,, | Performed by: INTERNAL MEDICINE

## 2023-11-08 PROCEDURE — 1160F RVW MEDS BY RX/DR IN RCRD: CPT | Mod: CPTII,S$GLB,, | Performed by: INTERNAL MEDICINE

## 2023-11-08 PROCEDURE — 1125F PR PAIN SEVERITY QUANTIFIED, PAIN PRESENT: ICD-10-PCS | Mod: CPTII,S$GLB,, | Performed by: INTERNAL MEDICINE

## 2023-11-08 PROCEDURE — 99213 OFFICE O/P EST LOW 20 MIN: CPT | Mod: S$GLB,,, | Performed by: INTERNAL MEDICINE

## 2023-11-08 PROCEDURE — 99999 PR PBB SHADOW E&M-EST. PATIENT-LVL III: CPT | Mod: PBBFAC,,, | Performed by: INTERNAL MEDICINE

## 2023-11-08 PROCEDURE — 1160F PR REVIEW ALL MEDS BY PRESCRIBER/CLIN PHARMACIST DOCUMENTED: ICD-10-PCS | Mod: CPTII,S$GLB,, | Performed by: INTERNAL MEDICINE

## 2023-11-08 PROCEDURE — 1159F MED LIST DOCD IN RCRD: CPT | Mod: CPTII,S$GLB,, | Performed by: INTERNAL MEDICINE

## 2023-11-08 PROCEDURE — 99213 PR OFFICE/OUTPT VISIT, EST, LEVL III, 20-29 MIN: ICD-10-PCS | Mod: S$GLB,,, | Performed by: INTERNAL MEDICINE

## 2023-11-08 RX ORDER — BUDESONIDE 0.5 MG/2ML
0.5 INHALANT ORAL 2 TIMES DAILY
Qty: 120 ML | Refills: 5 | Status: SHIPPED | OUTPATIENT
Start: 2023-11-08

## 2023-11-08 RX ORDER — PREDNISONE 5 MG/1
5 TABLET ORAL EVERY OTHER DAY
Qty: 90 TABLET | Refills: 3 | Status: SHIPPED | OUTPATIENT
Start: 2023-11-08

## 2023-11-08 RX ORDER — IPRATROPIUM BROMIDE AND ALBUTEROL SULFATE 2.5; .5 MG/3ML; MG/3ML
3 SOLUTION RESPIRATORY (INHALATION) EVERY 6 HOURS PRN
Qty: 360 ML | Refills: 6 | Status: SHIPPED | OUTPATIENT
Start: 2023-11-08 | End: 2024-11-07

## 2023-11-08 RX ORDER — FLUTICASONE FUROATE AND VILANTEROL 200; 25 UG/1; UG/1
1 POWDER RESPIRATORY (INHALATION) DAILY
Qty: 60 EACH | Refills: 6 | Status: SHIPPED | OUTPATIENT
Start: 2023-11-08

## 2023-11-08 NOTE — PROGRESS NOTES
"Subjective:      Patient ID: Ciara Verma is a 89 y.o. female.    Chief Complaint: F/U lung CA, COPD    Cough        89 yo female with history of arthritis, former smoker, who developed hemoptysis and nosebleed following a fall. This prompted a CT of the chest showing MITESH density increased in solid compnent compared to 2018 study. I saw her in October 2019 for that reason. PET/CT imaging showed low-level activity and plans were made for follow-up pet imaging for surveillance. This showed significant PET activity and subsequently underwent SBRT of suspect MITESH lesion. Has had issues with radiation pneumonia and occasional hemoptysis as well as post-treatment neuralgia and chest wall pain.     August 2023:  Last visit in Feb 2023 was having GI/esophageal issues. Since that time had esophageal dilation and is improved. Currently complaint is mainly left chest wall pain/neuralgia. On neurontin BID and PRN norco. Persistent/lancinating pain. Tender to touch. PFTs and CT chest for review today. Breathing is "pretty good". No hemoptysis. No fever. Here with  and son.    November 2023:  Continued streaky hemoptysis every morning since being on Eliquis.  Complains of daily dyspnea with exertion that is relatively unchanged.  Her nebulizer machine quit working yesterday and needs a new one.  Daughter is conference done via phone call.  She is here today with her  and caretaker.  Chronic complaints mostly unchanged.      Review of Systems   Respiratory:  Positive for cough.     as per HPI otherwise negative    Objective:     Physical Exam   Constitutional: She is oriented to person, place, and time. She appears well-developed. No distress.   HENT:   Head: Normocephalic.   Cardiovascular: Normal rate and regular rhythm.   Pulmonary/Chest: Normal expansion. She has decreased breath sounds. She has no wheezes. She has rales.   Neurological: She is alert and oriented to person, place, and time.   Psychiatric: She has " "a normal mood and affect.   Nursing note and vitals reviewed.          11/8/2023    10:41 AM 3/22/2023    10:23 AM 2/14/2023    11:19 AM 2/1/2023     2:04 PM 12/7/2022     2:15 PM   Pulmonary Function Tests   SpO2 96 % 96 % 96 % 94 % 97 %   Height 5' 4" (1.626 m) 5' 5" (1.651 m) 5' 4" (1.626 m) 5' 4" (1.626 m) 5' 4" (1.626 m)   Weight 55.2 kg (121 lb 11.1 oz) 59.4 kg (130 lb 15.3 oz) 57.5 kg (126 lb 12.2 oz)  56.7 kg (125 lb)   BMI (Calculated) 20.9 21.8 21.7  21.4        Assessment:     1. Personal history of lung cancer    2. Mixed simple and mucopurulent chronic bronchitis    3. Post-radiation pneumonitis         Orders Placed This Encounter   Procedures    NEBULIZER FOR HOME USE     Order Specific Question:   Height:     Answer:   5' 4" (1.626 m)     Order Specific Question:   Weight:     Answer:   55.2 kg (121 lb 11.1 oz)     Order Specific Question:   Length of need (1-99 months):     Answer:   99    NEBULIZER KIT (SUPPLIES) FOR HOME USE     Order Specific Question:   Height:     Answer:   5' 4" (1.626 m)     Order Specific Question:   Weight:     Answer:   55.2 kg (121 lb 11.1 oz)     Order Specific Question:   Length of need (1-99 months):     Answer:   99     Order Specific Question:   Mask or Mouthpiece?     Answer:   Mouthpiece       Plan:     Per the daughter Aldo was a good bit more expensive than her previous prescription for Breo  We will go back to Breo once daily  We will change albuterol via nebulizer to DuoNeb  Continue Pulmicort b.i.d.  Add prednisone 5 mg every other day  At this point it is mostly just symptom management given her age and comorbid risk factors  Discussed in detail with those present and daughter via phone voiced understanding and agreement with the above  Return in 3 months, sooner if needed     "

## 2024-02-20 DIAGNOSIS — G62.9 PERIPHERAL POLYNEUROPATHY: Primary | ICD-10-CM

## 2024-02-20 RX ORDER — GABAPENTIN 400 MG/1
800 CAPSULE ORAL 3 TIMES DAILY
Qty: 180 CAPSULE | Refills: 11 | Status: SHIPPED | OUTPATIENT
Start: 2024-02-20

## 2024-11-22 DIAGNOSIS — J41.8 MIXED SIMPLE AND MUCOPURULENT CHRONIC BRONCHITIS: ICD-10-CM

## 2024-11-22 RX ORDER — PREDNISONE 5 MG/1
5 TABLET ORAL EVERY OTHER DAY
Qty: 50 TABLET | Refills: 0 | Status: SHIPPED | OUTPATIENT
Start: 2024-11-22